# Patient Record
Sex: FEMALE | Race: BLACK OR AFRICAN AMERICAN | NOT HISPANIC OR LATINO | URBAN - METROPOLITAN AREA
[De-identification: names, ages, dates, MRNs, and addresses within clinical notes are randomized per-mention and may not be internally consistent; named-entity substitution may affect disease eponyms.]

---

## 2018-01-23 ENCOUNTER — INPATIENT (INPATIENT)
Facility: HOSPITAL | Age: 58
LOS: 2 days | Discharge: HOME | End: 2018-01-26
Attending: HOSPITALIST | Admitting: HOSPITALIST
Payer: COMMERCIAL

## 2018-01-23 VITALS
HEIGHT: 66 IN | OXYGEN SATURATION: 100 % | RESPIRATION RATE: 16 BRPM | DIASTOLIC BLOOD PRESSURE: 118 MMHG | WEIGHT: 175.05 LBS | SYSTOLIC BLOOD PRESSURE: 184 MMHG | HEART RATE: 107 BPM | TEMPERATURE: 98 F

## 2018-01-23 DIAGNOSIS — J05.10 ACUTE EPIGLOTTITIS WITHOUT OBSTRUCTION: ICD-10-CM

## 2018-01-23 LAB
ALBUMIN SERPL ELPH-MCNC: 4.2 G/DL — SIGNIFICANT CHANGE UP (ref 3.3–5)
ALP SERPL-CCNC: 82 U/L — SIGNIFICANT CHANGE UP (ref 40–120)
ALT FLD-CCNC: 16 U/L — SIGNIFICANT CHANGE UP (ref 12–78)
ANION GAP SERPL CALC-SCNC: 8 MMOL/L — SIGNIFICANT CHANGE UP (ref 5–17)
APTT BLD: 36.9 SEC — SIGNIFICANT CHANGE UP (ref 27.5–37.4)
AST SERPL-CCNC: 27 U/L — SIGNIFICANT CHANGE UP (ref 15–37)
BASOPHILS # BLD AUTO: 0.2 K/UL — SIGNIFICANT CHANGE UP (ref 0–0.2)
BASOPHILS NFR BLD AUTO: 2 % — SIGNIFICANT CHANGE UP (ref 0–2)
BILIRUB SERPL-MCNC: 0.7 MG/DL — SIGNIFICANT CHANGE UP (ref 0.2–1.2)
BUN SERPL-MCNC: 11 MG/DL — SIGNIFICANT CHANGE UP (ref 7–23)
CALCIUM SERPL-MCNC: 9.2 MG/DL — SIGNIFICANT CHANGE UP (ref 8.5–10.1)
CHLORIDE SERPL-SCNC: 103 MMOL/L — SIGNIFICANT CHANGE UP (ref 96–108)
CO2 SERPL-SCNC: 31 MMOL/L — SIGNIFICANT CHANGE UP (ref 22–31)
CREAT SERPL-MCNC: 0.84 MG/DL — SIGNIFICANT CHANGE UP (ref 0.5–1.3)
EOSINOPHIL # BLD AUTO: 0.2 K/UL — SIGNIFICANT CHANGE UP (ref 0–0.5)
EOSINOPHIL NFR BLD AUTO: 2 % — SIGNIFICANT CHANGE UP (ref 0–6)
FLUAV SPEC QL CULT: NEGATIVE — SIGNIFICANT CHANGE UP
FLUBV AG SPEC QL IA: NEGATIVE — SIGNIFICANT CHANGE UP
GLUCOSE SERPL-MCNC: 108 MG/DL — HIGH (ref 70–99)
HCT VFR BLD CALC: 40.6 % — SIGNIFICANT CHANGE UP (ref 34.5–45)
HGB BLD-MCNC: 13.9 G/DL — SIGNIFICANT CHANGE UP (ref 11.5–15.5)
INR BLD: 1.19 RATIO — HIGH (ref 0.88–1.16)
LACTATE SERPL-SCNC: 0.9 MMOL/L — SIGNIFICANT CHANGE UP (ref 0.7–2)
LYMPHOCYTES # BLD AUTO: 1.4 K/UL — SIGNIFICANT CHANGE UP (ref 1–3.3)
LYMPHOCYTES # BLD AUTO: 18 % — SIGNIFICANT CHANGE UP (ref 13–44)
MCHC RBC-ENTMCNC: 32.6 PG — SIGNIFICANT CHANGE UP (ref 27–34)
MCHC RBC-ENTMCNC: 34.1 GM/DL — SIGNIFICANT CHANGE UP (ref 32–36)
MCV RBC AUTO: 95.4 FL — SIGNIFICANT CHANGE UP (ref 80–100)
MONOCYTES # BLD AUTO: 0.5 K/UL — SIGNIFICANT CHANGE UP (ref 0–0.9)
MONOCYTES NFR BLD AUTO: 6.3 % — SIGNIFICANT CHANGE UP (ref 2–14)
NEUTROPHILS # BLD AUTO: 5.4 K/UL — SIGNIFICANT CHANGE UP (ref 1.8–7.4)
NEUTROPHILS NFR BLD AUTO: 71.7 % — SIGNIFICANT CHANGE UP (ref 43–77)
PLATELET # BLD AUTO: 307 K/UL — SIGNIFICANT CHANGE UP (ref 150–400)
POTASSIUM SERPL-MCNC: 4 MMOL/L — SIGNIFICANT CHANGE UP (ref 3.5–5.3)
POTASSIUM SERPL-SCNC: 4 MMOL/L — SIGNIFICANT CHANGE UP (ref 3.5–5.3)
PROT SERPL-MCNC: 8.5 GM/DL — HIGH (ref 6–8.3)
PROTHROM AB SERPL-ACNC: 13 SEC — HIGH (ref 9.8–12.7)
RBC # BLD: 4.25 M/UL — SIGNIFICANT CHANGE UP (ref 3.8–5.2)
RBC # FLD: 12 % — SIGNIFICANT CHANGE UP (ref 11–15)
SODIUM SERPL-SCNC: 142 MMOL/L — SIGNIFICANT CHANGE UP (ref 135–145)
WBC # BLD: 7.6 K/UL — SIGNIFICANT CHANGE UP (ref 3.8–10.5)
WBC # FLD AUTO: 7.6 K/UL — SIGNIFICANT CHANGE UP (ref 3.8–10.5)

## 2018-01-23 PROCEDURE — 71045 X-RAY EXAM CHEST 1 VIEW: CPT | Mod: 26

## 2018-01-23 PROCEDURE — 93010 ELECTROCARDIOGRAM REPORT: CPT

## 2018-01-23 PROCEDURE — 70360 X-RAY EXAM OF NECK: CPT | Mod: 26

## 2018-01-23 PROCEDURE — 70491 CT SOFT TISSUE NECK W/DYE: CPT | Mod: 26

## 2018-01-23 PROCEDURE — 99291 CRITICAL CARE FIRST HOUR: CPT

## 2018-01-23 RX ORDER — TIOTROPIUM BROMIDE 18 UG/1
1 CAPSULE ORAL; RESPIRATORY (INHALATION) DAILY
Qty: 0 | Refills: 0 | Status: DISCONTINUED | OUTPATIENT
Start: 2018-01-23 | End: 2018-01-25

## 2018-01-23 RX ORDER — CEFTRIAXONE 500 MG/1
1 INJECTION, POWDER, FOR SOLUTION INTRAMUSCULAR; INTRAVENOUS ONCE
Qty: 0 | Refills: 0 | Status: COMPLETED | OUTPATIENT
Start: 2018-01-23 | End: 2018-01-23

## 2018-01-23 RX ORDER — KETOROLAC TROMETHAMINE 30 MG/ML
30 SYRINGE (ML) INJECTION ONCE
Qty: 0 | Refills: 0 | Status: DISCONTINUED | OUTPATIENT
Start: 2018-01-23 | End: 2018-01-23

## 2018-01-23 RX ORDER — BENZOCAINE AND MENTHOL 5; 1 G/100ML; G/100ML
1 LIQUID ORAL ONCE
Qty: 0 | Refills: 0 | Status: DISCONTINUED | OUTPATIENT
Start: 2018-01-23 | End: 2018-01-23

## 2018-01-23 RX ORDER — DEXAMETHASONE 0.5 MG/5ML
4 ELIXIR ORAL EVERY 6 HOURS
Qty: 0 | Refills: 0 | Status: DISCONTINUED | OUTPATIENT
Start: 2018-01-23 | End: 2018-01-24

## 2018-01-23 RX ORDER — PIPERACILLIN AND TAZOBACTAM 4; .5 G/20ML; G/20ML
3.38 INJECTION, POWDER, LYOPHILIZED, FOR SOLUTION INTRAVENOUS EVERY 8 HOURS
Qty: 0 | Refills: 0 | Status: DISCONTINUED | OUTPATIENT
Start: 2018-01-23 | End: 2018-01-23

## 2018-01-23 RX ORDER — VANCOMYCIN HCL 1 G
1000 VIAL (EA) INTRAVENOUS ONCE
Qty: 0 | Refills: 0 | Status: COMPLETED | OUTPATIENT
Start: 2018-01-23 | End: 2018-01-23

## 2018-01-23 RX ORDER — CHLORHEXIDINE GLUCONATE 213 G/1000ML
1 SOLUTION TOPICAL DAILY
Qty: 0 | Refills: 0 | Status: DISCONTINUED | OUTPATIENT
Start: 2018-01-23 | End: 2018-01-24

## 2018-01-23 RX ORDER — ENOXAPARIN SODIUM 100 MG/ML
40 INJECTION SUBCUTANEOUS DAILY
Qty: 0 | Refills: 0 | Status: DISCONTINUED | OUTPATIENT
Start: 2018-01-23 | End: 2018-01-26

## 2018-01-23 RX ORDER — CEFTRIAXONE 500 MG/1
1 INJECTION, POWDER, FOR SOLUTION INTRAMUSCULAR; INTRAVENOUS EVERY 24 HOURS
Qty: 0 | Refills: 0 | Status: DISCONTINUED | OUTPATIENT
Start: 2018-01-24 | End: 2018-01-26

## 2018-01-23 RX ORDER — IPRATROPIUM/ALBUTEROL SULFATE 18-103MCG
3 AEROSOL WITH ADAPTER (GRAM) INHALATION EVERY 6 HOURS
Qty: 0 | Refills: 0 | Status: DISCONTINUED | OUTPATIENT
Start: 2018-01-23 | End: 2018-01-25

## 2018-01-23 RX ORDER — VANCOMYCIN HCL 1 G
1000 VIAL (EA) INTRAVENOUS EVERY 8 HOURS
Qty: 0 | Refills: 0 | Status: DISCONTINUED | OUTPATIENT
Start: 2018-01-23 | End: 2018-01-25

## 2018-01-23 RX ORDER — CEFTRIAXONE 500 MG/1
INJECTION, POWDER, FOR SOLUTION INTRAMUSCULAR; INTRAVENOUS
Qty: 0 | Refills: 0 | Status: DISCONTINUED | OUTPATIENT
Start: 2018-01-23 | End: 2018-01-23

## 2018-01-23 RX ORDER — LOSARTAN/HYDROCHLOROTHIAZIDE 100MG-25MG
0 TABLET ORAL
Qty: 0 | Refills: 0 | COMMUNITY

## 2018-01-23 RX ORDER — DEXAMETHASONE 0.5 MG/5ML
6 ELIXIR ORAL ONCE
Qty: 0 | Refills: 0 | Status: COMPLETED | OUTPATIENT
Start: 2018-01-23 | End: 2018-01-23

## 2018-01-23 RX ORDER — INFLUENZA VIRUS VACCINE 15; 15; 15; 15 UG/.5ML; UG/.5ML; UG/.5ML; UG/.5ML
0.5 SUSPENSION INTRAMUSCULAR ONCE
Qty: 0 | Refills: 0 | Status: COMPLETED | OUTPATIENT
Start: 2018-01-23 | End: 2018-01-24

## 2018-01-23 RX ORDER — ALBUTEROL 90 UG/1
1 AEROSOL, METERED ORAL EVERY 4 HOURS
Qty: 0 | Refills: 0 | Status: DISCONTINUED | OUTPATIENT
Start: 2018-01-23 | End: 2018-01-25

## 2018-01-23 RX ORDER — CEFTRIAXONE 500 MG/1
1 INJECTION, POWDER, FOR SOLUTION INTRAMUSCULAR; INTRAVENOUS ONCE
Qty: 0 | Refills: 0 | Status: DISCONTINUED | OUTPATIENT
Start: 2018-01-23 | End: 2018-01-23

## 2018-01-23 RX ORDER — PANTOPRAZOLE SODIUM 20 MG/1
40 TABLET, DELAYED RELEASE ORAL DAILY
Qty: 0 | Refills: 0 | Status: DISCONTINUED | OUTPATIENT
Start: 2018-01-23 | End: 2018-01-25

## 2018-01-23 RX ORDER — SODIUM CHLORIDE 9 MG/ML
1000 INJECTION, SOLUTION INTRAVENOUS
Qty: 0 | Refills: 0 | Status: DISCONTINUED | OUTPATIENT
Start: 2018-01-23 | End: 2018-01-25

## 2018-01-23 RX ORDER — CEFTRIAXONE 500 MG/1
INJECTION, POWDER, FOR SOLUTION INTRAMUSCULAR; INTRAVENOUS
Qty: 0 | Refills: 0 | Status: DISCONTINUED | OUTPATIENT
Start: 2018-01-23 | End: 2018-01-26

## 2018-01-23 RX ADMIN — SODIUM CHLORIDE 50 MILLILITER(S): 9 INJECTION, SOLUTION INTRAVENOUS at 14:10

## 2018-01-23 RX ADMIN — Medication 4 MILLIGRAM(S): at 10:48

## 2018-01-23 RX ADMIN — Medication 250 MILLIGRAM(S): at 08:23

## 2018-01-23 RX ADMIN — Medication 30 MILLIGRAM(S): at 08:07

## 2018-01-23 RX ADMIN — Medication 3 MILLILITER(S): at 17:15

## 2018-01-23 RX ADMIN — Medication 4 MILLIGRAM(S): at 23:46

## 2018-01-23 RX ADMIN — CEFTRIAXONE 100 GRAM(S): 500 INJECTION, POWDER, FOR SOLUTION INTRAMUSCULAR; INTRAVENOUS at 15:28

## 2018-01-23 RX ADMIN — PANTOPRAZOLE SODIUM 40 MILLIGRAM(S): 20 TABLET, DELAYED RELEASE ORAL at 16:28

## 2018-01-23 RX ADMIN — Medication 4 MILLIGRAM(S): at 17:43

## 2018-01-23 RX ADMIN — PIPERACILLIN AND TAZOBACTAM 25 GRAM(S): 4; .5 INJECTION, POWDER, LYOPHILIZED, FOR SOLUTION INTRAVENOUS at 14:04

## 2018-01-23 RX ADMIN — Medication 250 MILLIGRAM(S): at 16:28

## 2018-01-23 RX ADMIN — ENOXAPARIN SODIUM 40 MILLIGRAM(S): 100 INJECTION SUBCUTANEOUS at 14:04

## 2018-01-23 RX ADMIN — Medication 30 MILLIGRAM(S): at 03:14

## 2018-01-23 RX ADMIN — Medication 6 MILLIGRAM(S): at 03:14

## 2018-01-23 RX ADMIN — CHLORHEXIDINE GLUCONATE 1 APPLICATION(S): 213 SOLUTION TOPICAL at 16:44

## 2018-01-23 NOTE — H&P ADULT - NSHPLABSRESULTS_GEN_ALL_CORE
Lab Results:  CBC  CBC Full  -  ( 23 Jan 2018 03:46 )  WBC Count : 7.6 K/uL  Hemoglobin : 13.9 g/dL  Hematocrit : 40.6 %  Platelet Count - Automated : 307 K/uL  Mean Cell Volume : 95.4 fl  Mean Cell Hemoglobin : 32.6 pg  Mean Cell Hemoglobin Concentration : 34.1 gm/dL  Auto Neutrophil # : 5.4 K/uL  Auto Lymphocyte # : 1.4 K/uL  Auto Monocyte # : 0.5 K/uL  Auto Eosinophil # : 0.2 K/uL  Auto Basophil # : 0.2 K/uL  Auto Neutrophil % : 71.7 %  Auto Lymphocyte % : 18.0 %  Auto Monocyte % : 6.3 %  Auto Eosinophil % : 2.0 %  Auto Basophil % : 2.0 %    .		Differential:	[] Automated		[] Manual  Chemistry                        13.9   7.6   )-----------( 307      ( 23 Jan 2018 03:46 )             40.6     01-23    142  |  103  |  11  ----------------------------<  108<H>  4.0   |  31  |  0.84    Ca    9.2      23 Jan 2018 03:46    TPro  8.5<H>  /  Alb  4.2  /  TBili  0.7  /  DBili  x   /  AST  27  /  ALT  16  /  AlkPhos  82  01-23    LIVER FUNCTIONS - ( 23 Jan 2018 03:46 )  Alb: 4.2 g/dL / Pro: 8.5 gm/dL / ALK PHOS: 82 U/L / ALT: 16 U/L / AST: 27 U/L / GGT: x           PT/INR - ( 23 Jan 2018 03:46 )   PT: 13.0 sec;   INR: 1.19 ratio         PTT - ( 23 Jan 2018 03:46 )  PTT:36.9 sec          MICROBIOLOGY/CULTURES:      RADIOLOGY RESULTS:

## 2018-01-23 NOTE — ED ADULT TRIAGE NOTE - CHIEF COMPLAINT QUOTE
BIBA,  throat pain and then tightening.   pt took codeine for throat pain at 9:30 and felt tightening.  pt was Rx codeine for bronchitis 3 weeks ago and had no adverse reaction prior. BIBA,  hypertension and  throat pain and then tightening.   pt took codeine for throat pain at 9:30 and felt tightening.  pt was Rx codeine for bronchitis 3 weeks ago and had no adverse reaction prior.  pt in no acute distress

## 2018-01-23 NOTE — CHART NOTE - NSCHARTNOTEFT_GEN_A_CORE
58 yo female with PMH: HTN admitted with c/o sore throat that started yesterday.  Pt stated that the pain started abruptly during the day and got progressively worse despite taking codeine.  Near the end of the night pt stated that it hurt so much that she couldn't swallow anything.  She did c/o of mild cough but non-productive, more of just salivary secretions.  Pt called 911 when she felt her throat closing up and felt like she was gasping for air.  On arrival to ER pt noted to have "hot potato" voice and was given a dose of decadron and toradol with pt having immediate relief of her symptoms stating that she wasn't having any more significant pain and does not feeling of throat closing.  Neck XR in ER suspicious for epiglottitis.Plan:Pt started on decadron in, will continue for total of 3 doses,  iv abx with ceftriaxone and vancomycin, ENT consult Raj Isidro called.  Will keep NPO for now. Gentle IVF as pt is NPO.  Will treat HTN prn with IV meds.  noebs prn 58 yo female with PMH: HTN admitted with c/o sore throat that started yesterday.  Pt stated that the pain started abruptly during the day and got progressively worse despite taking codeine.  Near the end of the night pt stated that it hurt so much that she couldn't swallow anything.  She did c/o of mild cough but non-productive, more of just salivary secretions.  Pt called 911 when she felt her throat closing up and felt like she was gasping for air.  On arrival to ER pt noted to have "hot potato" voice and was given a dose of decadron and toradol with pt having immediate relief of her symptoms stating that she wasn't having any more significant pain and does not feeling of throat closing.  Neck XR in ER suspicious for epiglottitis. Pt started on decadron in, will continue for total of 3 doses,  iv abx with ceftriaxone and vancomycin, ENT consult Raj Isidro called, gentle IVF. Pt stable enough for transfer to telemetry. Case discussed w/ Dr. Devries. Pt signed out to Hospitalist service.

## 2018-01-23 NOTE — H&P ADULT - NSHPPHYSICALEXAM_GEN_ALL_CORE
PHYSICAL EXAM:    GENERAL: NAD, well-groomed, well-developed  HEAD:  Atraumatic, Normocephalic  ENMT: difficult to assess pharynx or uvula, no obvious tongue edema or redness  NECK: Supple,   NERVOUS SYSTEM:  Alert & Oriented X3, Good concentration;   CHEST/LUNG: Clear to percussion bilaterally;  HEART: Regular rate and rhythm; tachycardia  ABDOMEN: Soft, Nontender, Nondistended;   EXTREMITIES: No clubbing, cyanosis, or edema  LYMPH: No lymphadenopathy noted

## 2018-01-23 NOTE — ED ADULT NURSE NOTE - OBJECTIVE STATEMENT
58 y/o female PMHx HTN presents to the ED with HTN and "throat tightening" today. Denies dizziness, chest pain, blurry vision, double vision, cough, fevers, chills. Patient took codeine for throat pain at 930 pm, no relief

## 2018-01-23 NOTE — H&P ADULT - NSHPREVIEWOFSYSTEMS_GEN_ALL_CORE
REVIEW OF SYSTEMS      REVIEW OF SYSTEMS:    CONSTITUTIONAL: No fever,  ENMT:   ++throat pain  RESPIRATORY: mild cough, no wheezing, chills or hemoptysis; ++shortness of breath  CARDIOVASCULAR: No chest pain, palpitations,  GASTROINTESTINAL: No abdominal or epigastric pain. No nausea, vomiting,   GENITOURINARY: No dysuria,   NEUROLOGICAL: No headaches,   SKIN: No itching, burning, rashes, or lesions   MUSCULOSKELETAL: No joint pain or swelling; No muscle, back, or extremity pain

## 2018-01-23 NOTE — ED ADULT NURSE REASSESSMENT NOTE - NS ED NURSE REASSESS COMMENT FT1
received pt to bed 21 alert and oriented times 4. denies pain. states she only has pain when she tries to swallow. no drooling. no respiratory distress at present. noted with 20 guage iv to right ac. pt for admission for epiglotitis. plan of care explained to pt who verbalized understanding.

## 2018-01-23 NOTE — ED ADULT NURSE NOTE - CHIEF COMPLAINT QUOTE
BIBA,  hypertension and  throat pain and then tightening.   pt took codeine for throat pain at 9:30 and felt tightening.  pt was Rx codeine for bronchitis 3 weeks ago and had no adverse reaction prior.  pt in no acute distress

## 2018-01-23 NOTE — H&P ADULT - PROBLEM SELECTOR PLAN 1
iv abx with ceftriaxone and vancomycin.  Pt started on decadron in ER, will continue for total of 3 doses.  ENT consult Raj Isidro called.  Will keep NPO for now. Gentle IVF as pt is NPO.  Will treat HTN prn with IV meds.  duonebs prn

## 2018-01-23 NOTE — ED PROVIDER NOTE - PROGRESS NOTE DETAILS
Pt is alert and oriented x 3 speaking in clear full sentences no drooling no nasal flaring no shoulders retractions no spitting breath sounds are clear good equal bilaterally, Pt appears very comfortable sitting up in the stretcher in a bright light room ICU consult is eval pt now Pt's care is signed out to me now. ICU DR. Villagran accepts pt.

## 2018-01-23 NOTE — ED PROVIDER NOTE - PHYSICAL EXAMINATION
Gen: Alert, speaking in complete sentences  Head: NC, AT, EOMI, normal lids/conjunctiva  ENT: normal hearing, patent oropharynx without erythema/exudate, uvula midline  Neck: supple, no tenderness/meningismus/JVD, Trachea midline, FROM  Pulm: Bilateral clear BS, normal resp effort, no wheeze/stridor/retractions  CV: RRR, no M/R/G, +dist pulses  Abd: soft, NT/ND, +BS, no guarding/rebound tenderness  Mskel: no edema/erythema/cyanosis  Skin: no rash  Neuro: AAOx3, no sensory/motor deficits

## 2018-01-23 NOTE — H&P ADULT - HISTORY OF PRESENT ILLNESS
56 yo female with PMH: HTN admitted with c/o 58 yo female with PMH: HTN admitted with c/o sore throat that started yesterday.  Pt stated that the pain started abruptl 58 yo female with PMH: HTN admitted with c/o sore throat that started yesterday.  Pt stated that the pain started abruptly during the day and got progressively worse despite taking codeine.  Near the end of the night pt stated that it hurt so much that she couldn't swallow anything.  She did c/o of mild cough but non-productive, more of just salivary secretions.  Pt called 911 when she felt her throat closing up and felt like she was gasping for air.  On arrival to ER pt noted to have "hot potato" voice and was given a dose of decadron and toradol with pt having immediate relief of her symptoms stating that she wasn't having any more significant pain and does not feeling of throat closing.  Neck XR in ER suspicious for epiglottitis

## 2018-01-23 NOTE — ED PROVIDER NOTE - CRITICAL CARE PROVIDED
additional history taking/direct patient care (not related to procedure)/documentation/consult w/ pt's family directly relating to pts condition/interpretation of diagnostic studies/consultation with other physicians

## 2018-01-23 NOTE — H&P ADULT - ASSESSMENT
58 yo female with PMH: HTN admitted with c/o sore throat that started yesterday concerning for epiglottitis

## 2018-01-23 NOTE — H&P ADULT - ATTENDING COMMENTS
58 yo female with PMH: HTN admitted w/ sore throat, SOB, dysphagia and concern for epiglottitis. S/p steroids and antihistamines. CT does not show epiglottitis. Cont steroids, anhti histamine for now. f/u ENT re: fiberoptic eval. Cont abx for now. Monitor airway for compromise from potential epiglottitis.

## 2018-01-24 DIAGNOSIS — E04.1 NONTOXIC SINGLE THYROID NODULE: ICD-10-CM

## 2018-01-24 DIAGNOSIS — J02.9 ACUTE PHARYNGITIS, UNSPECIFIED: ICD-10-CM

## 2018-01-24 LAB
ANION GAP SERPL CALC-SCNC: 7 MMOL/L — SIGNIFICANT CHANGE UP (ref 5–17)
BUN SERPL-MCNC: 15 MG/DL — SIGNIFICANT CHANGE UP (ref 7–23)
CALCIUM SERPL-MCNC: 9 MG/DL — SIGNIFICANT CHANGE UP (ref 8.5–10.1)
CHLORIDE SERPL-SCNC: 105 MMOL/L — SIGNIFICANT CHANGE UP (ref 96–108)
CO2 SERPL-SCNC: 31 MMOL/L — SIGNIFICANT CHANGE UP (ref 22–31)
CREAT SERPL-MCNC: 0.94 MG/DL — SIGNIFICANT CHANGE UP (ref 0.5–1.3)
GLUCOSE SERPL-MCNC: 156 MG/DL — HIGH (ref 70–99)
HCT VFR BLD CALC: 37.9 % — SIGNIFICANT CHANGE UP (ref 34.5–45)
HGB BLD-MCNC: 13 G/DL — SIGNIFICANT CHANGE UP (ref 11.5–15.5)
MAGNESIUM SERPL-MCNC: 2.1 MG/DL — SIGNIFICANT CHANGE UP (ref 1.6–2.6)
MCHC RBC-ENTMCNC: 32 PG — SIGNIFICANT CHANGE UP (ref 27–34)
MCHC RBC-ENTMCNC: 34.3 GM/DL — SIGNIFICANT CHANGE UP (ref 32–36)
MCV RBC AUTO: 93.3 FL — SIGNIFICANT CHANGE UP (ref 80–100)
NRBC # BLD: 0 /100 WBCS — SIGNIFICANT CHANGE UP (ref 0–0)
PHOSPHATE SERPL-MCNC: 3.4 MG/DL — SIGNIFICANT CHANGE UP (ref 2.5–4.5)
PLATELET # BLD AUTO: 306 K/UL — SIGNIFICANT CHANGE UP (ref 150–400)
POTASSIUM SERPL-MCNC: 4 MMOL/L — SIGNIFICANT CHANGE UP (ref 3.5–5.3)
POTASSIUM SERPL-SCNC: 4 MMOL/L — SIGNIFICANT CHANGE UP (ref 3.5–5.3)
RBC # BLD: 4.06 M/UL — SIGNIFICANT CHANGE UP (ref 3.8–5.2)
RBC # FLD: 12.4 % — SIGNIFICANT CHANGE UP (ref 10.3–14.5)
SODIUM SERPL-SCNC: 143 MMOL/L — SIGNIFICANT CHANGE UP (ref 135–145)
WBC # BLD: 9.42 K/UL — SIGNIFICANT CHANGE UP (ref 3.8–10.5)
WBC # FLD AUTO: 9.42 K/UL — SIGNIFICANT CHANGE UP (ref 3.8–10.5)

## 2018-01-24 PROCEDURE — 99253 IP/OBS CNSLTJ NEW/EST LOW 45: CPT

## 2018-01-24 PROCEDURE — 99233 SBSQ HOSP IP/OBS HIGH 50: CPT

## 2018-01-24 RX ORDER — BENZOCAINE AND MENTHOL 5; 1 G/100ML; G/100ML
1 LIQUID ORAL EVERY 4 HOURS
Qty: 0 | Refills: 0 | Status: DISCONTINUED | OUTPATIENT
Start: 2018-01-24 | End: 2018-01-26

## 2018-01-24 RX ADMIN — Medication 250 MILLIGRAM(S): at 09:25

## 2018-01-24 RX ADMIN — Medication 4 MILLIGRAM(S): at 06:13

## 2018-01-24 RX ADMIN — Medication 3 MILLILITER(S): at 01:02

## 2018-01-24 RX ADMIN — ENOXAPARIN SODIUM 40 MILLIGRAM(S): 100 INJECTION SUBCUTANEOUS at 13:25

## 2018-01-24 RX ADMIN — INFLUENZA VIRUS VACCINE 0.5 MILLILITER(S): 15; 15; 15; 15 SUSPENSION INTRAMUSCULAR at 18:35

## 2018-01-24 RX ADMIN — Medication 3 MILLILITER(S): at 17:01

## 2018-01-24 RX ADMIN — Medication 3 MILLILITER(S): at 11:12

## 2018-01-24 RX ADMIN — Medication 250 MILLIGRAM(S): at 00:12

## 2018-01-24 RX ADMIN — CEFTRIAXONE 100 GRAM(S): 500 INJECTION, POWDER, FOR SOLUTION INTRAMUSCULAR; INTRAVENOUS at 14:59

## 2018-01-24 RX ADMIN — SODIUM CHLORIDE 50 MILLILITER(S): 9 INJECTION, SOLUTION INTRAVENOUS at 13:25

## 2018-01-24 RX ADMIN — Medication 3 MILLILITER(S): at 06:05

## 2018-01-24 RX ADMIN — Medication 250 MILLIGRAM(S): at 18:32

## 2018-01-24 RX ADMIN — PANTOPRAZOLE SODIUM 40 MILLIGRAM(S): 20 TABLET, DELAYED RELEASE ORAL at 13:24

## 2018-01-24 NOTE — PROGRESS NOTE ADULT - PROBLEM SELECTOR PLAN 2
s/p decadron in ICU for total of 3 doses.    ENT consult Raj Isidro noted, s/p laryngoscopy and no e/o epiglottitis , air way patent

## 2018-01-24 NOTE — CHART NOTE - NSCHARTNOTEFT_GEN_A_CORE
[facility]  Head & Neck Surgery Procedure Note      Name:                   Kathya Navarrete	             Surgeon:	Deejay Anthony MD  					  Date of Procedure: 01/24/2018                          Assistant:  None    Record Number:	 [id number]                             Anesthesia:  Local Anesthesia       Preoperative diagnosis:	Dysphagia, unspecified (R13.10)  	  Postoperative diagnosis:	Dysphagia, unspecified (R13.10)    Procedure:		Flexible Fiberoptic Laryngoscopy  ([id number])    INDICATION:  The patient is a 56 yo female with a past medical history significant for HTN presents to the Emergency Room at [facility] with a chief complaint of painful swallowing and drooling for the past several hours. The patient stated that the pain started abruptly during the day and got progressively worse despite taking codeine.  Near the end of the night pt stated that it hurt so much that she couldn't swallow anything.  She did c/o of mild cough but non-productive, more of just salivary secretions.  Pt called 911 when she felt her throat closing up and felt like she was gasping for air.  On arrival to ER pt noted to have "hot potato" voice and was given a dose of decadron and toradol with pt having immediate relief of her symptoms stating that she wasn't having any more significant pain and does not feeling of throat closing.  Neck XR in ER suspicious for epiglottitis    PROCEDURE: The patient was seen and her bilateral nasal cavities were prepped and sprayed with topical anesthesia of neosynephrine.   Following this, a fiberoptic flexible laryngoscope was passed into the left nasal cavity, and then slowly and meticulously moved posteriorly to the nasopharynx.  There was no evidence of pus or blood within the left anterior and posterior superior lateral nasal wall. There was clear mucous within the nasal cavity.  Once at nasopharynx the skull base and lateral walls of the eustachian tubes were examined for lesions and masses.  There was no blood or masses within the nasopharynx. There was mild prominence of the nasopharyngeal soft tissue consistent with inflammatory reaction.  The fiberoptic endoscope was then carefully directed inferiorly and moved to the hypopharynx and glottis.  There was no fullness of the base of tongue.  There was 1-2+ prominence of the tonsils bilaterally (equally) and without exudate. There was no evidence of retropharyngeal erythema or edema.  There was mild  diffuse erythema  of the pharyngeal wall and supraglottic structure consistent with acue pharyngitis. The epiglottis was within normal limits.  The true vocal cords appeared to be mobile bilaterally.  There was no evidence of pooling of secretions, or obvious aspiration or penetration of liquid into the glottis.  The airway was widely patent. The patient tolerated the procedure well.

## 2018-01-24 NOTE — CONSULT NOTE ADULT - COMMENTS
Vital Signs Last 24 Hrs  T(C): 36.5 (23 Jan 2018 23:13), Max: 37.1 (23 Jan 2018 10:56)  T(F): 97.7 (23 Jan 2018 23:13), Max: 98.8 (23 Jan 2018 15:11)  HR: 71 (24 Jan 2018 00:00) (67 - 105)  BP: 150/85 (24 Jan 2018 00:00) (118/65 - 156/80)  BP(mean): 101 (24 Jan 2018 00:00) (77 - 103)  RR: 18 (24 Jan 2018 00:00) (13 - 22)  SpO2: 97% (24 Jan 2018 00:00) (92% - 99%)

## 2018-01-24 NOTE — CONSULT NOTE ADULT - ENMT COMMENTS
Flexible Fiberoptic Laryngoscopy: clear nasopharynx to glottis, diffuse pharygneal erythema, epiglottis wnl, tvc mobile b/l, airway widely patent

## 2018-01-24 NOTE — CONSULT NOTE ADULT - SUBJECTIVE AND OBJECTIVE BOX
History of Present Illness: 	  The patient is a 58 yo female with a past medical history significant for HTN presents to the Emergency Room at [facility] with a chief complaint of painful swallowing and drooling for the past several hours. The patient stated that the pain started abruptly during the day and got progressively worse despite taking codeine.  Near the end of the night pt stated that it hurt so much that she couldn't swallow anything.  She did c/o of mild cough but non-productive, more of just salivary secretions.  Pt called 911 when she felt her throat closing up and felt like she was gasping for air.  On arrival to ER pt noted to have "hot potato" voice and was given a dose of decadron and toradol with pt having immediate relief of her symptoms stating that she wasn't having any more significant pain and does not feeling of throat closing.  Neck XR in ER suspicious for epiglottitis        Allergies and Intolerances:        Allergies:  	No Known Allergies:     Home Medications:   * Patient Currently Takes Medications as of 23-Jan-2018 02:25 documented in Structured Notes  · 	Hyzaar: Last Dose Taken:      .    Patient History:    Past Medical History:  HTN (hypertension).     Tobacco Screening:  · Core Measure Site	Yes	  · Has the patient used tobacco in the past 30 days?	No 	    Risk Assessment:    Present on Admission:  Deep Venous Thrombosis	no 	  Pulmonary Embolus	no 	     Heart Failure:  Does this patient have a history of or has been diagnosed with heart failure? no.    HIV Screen (per [facility], HIV screening must be offered to every individual between ages 13 and 64)	Offered and patient declined 	  Hepatitis C Screen (per [facility], hepatitis C screening must be offered to every individual born between 1945 and 1965)	Offered and patient declined 	      labs:  CBC Full  -  ( 23 Jan 2018 03:46 )  WBC Count : 7.6 K/uL  Hemoglobin : 13.9 g/dL  Hematocrit : 40.6 %  Platelet Count - Automated : 307 K/uL  Mean Cell Volume : 95.4 fl  Mean Cell Hemoglobin : 32.6 pg  Mean Cell Hemoglobin Concentration : 34.1 gm/dL  Auto Neutrophil # : 5.4 K/uL  Auto Lymphocyte # : 1.4 K/uL  Auto Monocyte # : 0.5 K/uL  Auto Eosinophil # : 0.2 K/uL  Auto Basophil # : 0.2 K/uL  Auto Neutrophil % : 71.7 %  Auto Lymphocyte % : 18.0 %  Auto Monocyte % : 6.3 %  Auto Eosinophil % : 2.0 %  Auto Basophil % : 2.0 %

## 2018-01-25 LAB — VANCOMYCIN TROUGH SERPL-MCNC: 23.8 UG/ML — HIGH (ref 10–20)

## 2018-01-25 PROCEDURE — 99233 SBSQ HOSP IP/OBS HIGH 50: CPT

## 2018-01-25 RX ADMIN — BENZOCAINE AND MENTHOL 1 LOZENGE: 5; 1 LIQUID ORAL at 14:50

## 2018-01-25 RX ADMIN — Medication 3 MILLILITER(S): at 00:08

## 2018-01-25 RX ADMIN — Medication 3 MILLILITER(S): at 05:20

## 2018-01-25 RX ADMIN — BENZOCAINE AND MENTHOL 1 LOZENGE: 5; 1 LIQUID ORAL at 09:04

## 2018-01-25 RX ADMIN — Medication 250 MILLIGRAM(S): at 00:52

## 2018-01-25 RX ADMIN — CEFTRIAXONE 100 GRAM(S): 500 INJECTION, POWDER, FOR SOLUTION INTRAMUSCULAR; INTRAVENOUS at 17:04

## 2018-01-25 RX ADMIN — BENZOCAINE AND MENTHOL 1 LOZENGE: 5; 1 LIQUID ORAL at 18:36

## 2018-01-25 RX ADMIN — Medication 250 MILLIGRAM(S): at 09:03

## 2018-01-25 RX ADMIN — Medication 3 MILLILITER(S): at 11:00

## 2018-01-25 RX ADMIN — ENOXAPARIN SODIUM 40 MILLIGRAM(S): 100 INJECTION SUBCUTANEOUS at 12:00

## 2018-01-25 NOTE — CONSULT NOTE ADULT - ASSESSMENT
Thyroid Nodules
57 yr old female seen with
Assessment:  The patient is a 56 yo female with a past medical history significant for HTN presents to the Emergency Room at [facility] with a chief complaint of painful swallowing and drooling admitted for possible epiglottitis.  Laryngoscopy consistent with Acute Pharyngitis. No e/o epiglottitis. Airway widely patent    Plan:  1) antibiotics as per primary team  2) cepacol lozenges prn odynophagia  3) diet as tolerated  4) dispo as per primary team

## 2018-01-25 NOTE — CONSULT NOTE ADULT - PROBLEM SELECTOR RECOMMENDATION 9
Thyroid nodules on CT scan   will need Ultrasound for definitive diagnosis and characterisation of the thyroid and its nodules - can be done as an out-patient   Check TSH  and free T4 and thyroid antibodies   Thank You for the courtesy of this consultation !!!
s/p steroids  swelling resolved  no obvious exudate   check strept throat   cont rocephin   switch to oral once able to take oral   diet has been started at liquids now  s/p laryngoscopy by ENT

## 2018-01-25 NOTE — CONSULT NOTE ADULT - SUBJECTIVE AND OBJECTIVE BOX
Patient is a 57y old  Female who presents with a chief complaint of sore throat (23 Jan 2018 16:02)      Reason For Consult: thyroid nodules    HPI:  56 yo female with PMH: HTN admitted with c/o sore throat that started yesterday.  Pt stated that the pain started abruptly during the day and got progressively worse despite taking codeine.  Near the end of the night pt stated that it hurt so much that she couldn't swallow anything.  She did c/o of mild cough but non-productive, more of just salivary secretions.  Pt called 911 when she felt her throat closing up and felt like she was gasping for air.  On arrival to ER pt noted to have "hot potato" voice and was given a dose of decadron and toradol with pt having immediate relief of her symptoms stating that she wasn't having any more significant pain and does not feeling of throat closing.  Neck XR in ER suspicious for epiglottitis (23 Jan 2018 16:02)  Endocrine History : No prior thyroid or other Endocrine history     PAST MEDICAL & SURGICAL HISTORY:  HTN (hypertension)      FAMILY HISTORY:        Social History:    MEDICATIONS  (STANDING):  cefTRIAXone   IVPB 1 Gram(s) IV Intermittent every 24 hours  cefTRIAXone   IVPB      enoxaparin Injectable 40 milliGRAM(s) SubCutaneous daily    MEDICATIONS  (PRN):  benzocaine 15 mG/menthol 3.6 mG Lozenge 1 Lozenge Oral every 4 hours PRN Sore Throat      REVIEW OF SYSTEMS:  CONSTITUTIONAL:  as per HPI  HEENT:  Eyes:  No diplopia or blurred vision. ENT:  No earache, sore throat or runny nose.  CARDIOVASCULAR:  No pressure, squeezing, strangling, tightness, heaviness or aching about the chest, neck, axilla or epigastrium.  RESPIRATORY:  No cough, shortness of breath, PND or orthopnea.  GASTROINTESTINAL:  No nausea, vomiting or diarrhea.  GENITOURINARY:  No dysuria, frequency or urgency. No Blood in urine  MUSCULOSKELETAL:  no joint aches, no muscle pain, myalgia  SKIN:  No change in skin, hair or nails.  NEUROLOGIC:  No paresthesias, fasciculations, seizures or weakness.  PSYCHIATRIC:  No disorder of thought or mood.  ENDOCRINE:  No heat or cold intolerance, polyuria or polydipsia. abnormal weight gain or loss, oral thrush  HEMATOLOGICAL:  No easy bruising or bleeding.     T(C): 36.6 (01-25-18 @ 17:14), Max: 37.1 (01-25-18 @ 05:34)  HR: 94 (01-25-18 @ 17:14) (58 - 94)  BP: 129/80 (01-25-18 @ 17:14) (118/65 - 129/80)  RR: 18 (01-25-18 @ 17:14) (16 - 18)  SpO2: 96% (01-25-18 @ 17:14) (96% - 100%)  Wt(kg): --    PHYSICAL EXAM:  GENERAL: NAD, well-groomed, well-developed  HEAD:  Atraumatic, Normocephalic  EYES: EOMI, PERRLA, conjunctiva and sclera clear  ENMT: No tonsillar erythema, exudates, or enlargement; Moist mucous membranes, Good dentition, No lesions  NECK: Supple, No JVD, Normal thyroid, No nodules palpable   NERVOUS SYSTEM:  Alert & Oriented X3, Good concentration; Motor Strength 5/5 B/L upper and lower extremities; DTRs 2+ intact and symmetric  CHEST/LUNG: Clear to percussion bilaterally; No rales, rhonchi, wheezing, or rubs  HEART: Regular rate and rhythm; No murmurs, rubs, or gallops  ABDOMEN: Soft, Nontender, Nondistended; Bowel sounds present  EXTREMITIES:  2+ Peripheral Pulses, No clubbing, cyanosis, or edema  LYMPH: No lymphadenopathy noted  SKIN: No rashes or lesions    CAPILLARY BLOOD GLUCOSE                                13.0   9.42  )-----------( 306      ( 24 Jan 2018 10:14 )             37.9       CMP:  01-24 @ 10:14  SGPT --  Albumin --   Alk Phos --   Anion Gap 7   SGOT --   Total Bili --   BUN 15   Calcium Total 9.0   CO2 31   Chloride 105   Creatinine 0.94   eGFR if AA 79   eGFR if non AA 68   Glucose 156   Potassium 4.0   Protein --   Sodium 143      Thyroid Function Tests:      Diabetes Tests:     Parathyroids:     Adrenals:       Radiology:

## 2018-01-26 VITALS
SYSTOLIC BLOOD PRESSURE: 143 MMHG | HEART RATE: 84 BPM | OXYGEN SATURATION: 95 % | DIASTOLIC BLOOD PRESSURE: 68 MMHG | RESPIRATION RATE: 16 BRPM | TEMPERATURE: 98 F

## 2018-01-26 LAB
T4 FREE SERPL-MCNC: 1.4 NG/DL — SIGNIFICANT CHANGE UP (ref 0.9–1.8)
THYROGLOB AB FLD IA-ACNC: <20 IU/ML — SIGNIFICANT CHANGE UP (ref 0–40)
THYROGLOB AB SERPL-ACNC: <20 IU/ML — SIGNIFICANT CHANGE UP (ref 0–40)
THYROPEROXIDASE AB SERPL-ACNC: <10 IU/ML — SIGNIFICANT CHANGE UP (ref 0–34.9)
TSH SERPL-MCNC: 2.48 UU/ML — SIGNIFICANT CHANGE UP (ref 0.36–3.74)

## 2018-01-26 PROCEDURE — 99239 HOSP IP/OBS DSCHRG MGMT >30: CPT

## 2018-01-26 RX ADMIN — BENZOCAINE AND MENTHOL 1 LOZENGE: 5; 1 LIQUID ORAL at 12:27

## 2018-01-26 NOTE — DISCHARGE NOTE ADULT - HOSPITAL COURSE
56 yo female with PMH: HTN admitted with c/o sore throat  .  Pt stated that the pain started abruptly during the day and got progressively worse despite taking codeine.  Near the end of the night pt stated that it hurt so much that she couldn't swallow anything.  She did c/o of mild cough but non-productive, more of just salivary secretions.  Pt called 911 when she felt her throat closing up and felt like she was gasping for air.  On arrival to ER pt noted to have "hot potato" voice and was given a dose of decadron and toradol with pt having immediate relief of her symptoms stating that she wasn't having any more significant pain and does not feeling of throat closing.  Neck XR in ER suspicious for epiglottitis. Pt started on decadron IV,   iv abx with ceftriaxone and vancomycin, was admitted to ICU, ID and  ENT consult Raj Isidro consulted who performed  Flexible Fiberoptic Laryngoscopy , findings consistent w/   with acute pharyngitis. The epiglottis was within normal limits.d/c d ABX, ID cleared for d/c. pt tolerating PO well with no discomfort. d/c planning w/ outpt f/u 58 yo female with PMH: HTN admitted with c/o sore throat  .  Pt stated that the pain started abruptly during the day and got progressively worse despite taking codeine.  Near the end of the night pt stated that it hurt so much that she couldn't swallow anything.  She did c/o of mild cough but non-productive, more of just salivary secretions.  Pt called 911 when she felt her throat closing up and felt like she was gasping for air.  On arrival to ER pt noted to have "hot potato" voice and was given a dose of decadron and toradol with pt having immediate relief of her symptoms stating that she wasn't having any more significant pain and does not feeling of throat closing.  Neck XR in ER suspicious for epiglottitis. Pt started on decadron IV,   iv abx with ceftriaxone and vancomycin, was admitted to ICU, ID and  ENT consult Raj Isidro consulted who performed  Flexible Fiberoptic Laryngoscopy , findings consistent w/   with acute pharyngitis. The epiglottis was within normal limits.d/c d ABX, ID cleared for d/c. pt tolerating PO well with no discomfort. d/c planning w/ outpt f/u  Time spent =60min

## 2018-01-26 NOTE — PROGRESS NOTE ADULT - SUBJECTIVE AND OBJECTIVE BOX
Patient is a 57y old  Female who presents with a chief complaint of sore throat (23 Jan 2018 16:02)      Interval History: TSH is 2.48, free T4 and thyroid antibodies are Pending   definitive ultrasound needs to be done     MEDICATIONS  (STANDING):  enoxaparin Injectable 40 milliGRAM(s) SubCutaneous daily    MEDICATIONS  (PRN):  benzocaine 15 mG/menthol 3.6 mG Lozenge 1 Lozenge Oral every 4 hours PRN Sore Throat      Allergies    No Known Allergies    Intolerances        REVIEW OF SYSTEMS:  CONSTITUTIONAL:   EYES: No eye pain, visual disturbances, or discharge  ENMT:  No difficulty hearing, tinnitus, vertigo; No sinus or throat pain  NECK: No pain or stiffness  RESPIRATORY: No cough, wheezing, chills or hemoptysis; No shortness of breath  CARDIOVASCULAR: No chest pain, palpitations, dizziness, or leg swelling  GASTROINTESTINAL: No abdominal or epigastric pain. No nausea, vomiting, or hematemesis; No diarrhea or constipation. No melena or hematochezia.  GENITOURINARY: No dysuria, frequency, hematuria, or incontinence  NEUROLOGICAL: No headaches, memory loss, loss of strength, numbness, or tremors  SKIN: No itching, burning, rashes, or lesions   LYMPH NODES: No enlarged glands  ENDOCRINE: No heat or cold intolerance; No hair loss  MUSCULOSKELETAL: No joint pain or swelling; No muscle, back, or extremity pain  PSYCHIATRIC: No depression, anxiety, mood swings, or difficulty sleeping  HEME/LYMPH: No easy bruising, or bleeding gums  ALLERY AND IMMUNOLOGIC: No hives or eczema    Vital Signs Last 24 Hrs  T(C): 36.6 (26 Jan 2018 05:55), Max: 37.1 (25 Jan 2018 12:14)  T(F): 97.8 (26 Jan 2018 05:55), Max: 98.8 (25 Jan 2018 12:14)  HR: 91 (26 Jan 2018 05:55) (79 - 94)  BP: 144/89 (26 Jan 2018 05:55) (124/76 - 144/89)  BP(mean): --  RR: 16 (26 Jan 2018 05:55) (16 - 18)  SpO2: 96% (26 Jan 2018 05:55) (95% - 98%)    PHYSICAL EXAM:  GENERAL:   HEAD:  Atraumatic, Normocephalic  EYES: EOMI, PERRLA, conjunctiva and sclera clear  ENMT: No tonsillar erythema, exudates, or enlargement; Moist mucous membranes, Good dentition, No lesions  NECK: Supple, No JVD, Normal thyroid  NERVOUS SYSTEM:  Alert & Oriented X3, Good concentration; Motor Strength 5/5 B/L upper and lower extremities; DTRs 2+ intact and symmetric  CHEST/LUNG: Clear to percussion bilaterally; No rales, rhonchi, wheezing, or rubs  HEART: Regular rate and rhythm; No murmurs, rubs, or gallops  ABDOMEN: Soft, Nontender, Nondistended; Bowel sounds present  EXTREMITIES:  2+ Peripheral Pulses, No clubbing, cyanosis, or edema  SKIN: No rashes or lesions    LABS:        CAPILLARY BLOOD GLUCOSE        Lipid panel:           Thyroid:01-26 @ 06:28 TSH 2.480 <<Free T4>> -- <<T3>> -- <<TG Ab>> -- <<ATPOAB>> -- <<TBG>> -- <<TSI>> -- Reverse T3 --    Diabetes Tests:  Parathyroid Panel:  Adrenals:  RADIOLOGY & ADDITIONAL TESTS:    Imaging Personally Reviewed:  [ ] YES  [ ] NO    Consultant(s) Notes Reviewed:  [ ] YES  [ ] NO    Care Discussed with Consultants/Other Providers [ ] YES  [ ] NO
CHIEF COMPLAINT/INTERVAL HISTORY:    Patient is a 57y old  Female who presents with a chief complaint of sore throat (23 Jan 2018 16:02)      HPI:  56 yo female with PMH: HTN admitted with c/o sore throat that started yesterday.  Pt stated that the pain started abruptly during the day and got progressively worse despite taking codeine.  Near the end of the night pt stated that it hurt so much that she couldn't swallow anything.  She did c/o of mild cough but non-productive, more of just salivary secretions.  Pt called 911 when she felt her throat closing up and felt like she was gasping for air.  On arrival to ER pt noted to have "hot potato" voice and was given a dose of decadron and toradol with pt having immediate relief of her symptoms stating that she wasn't having any more significant pain and does not feeling of throat closing.  Neck XR in ER suspicious for epiglottitis (23 Jan 2018 16:02)      SUBJECTIVE & OBJECTIVE: Pt seen and examined at bedside.  Feels better  Odynophagia resolved.  no sob  no cough  no stridor, no wheezing  Tolerating diet without any difficulty.      Vital Signs Last 24 Hrs  T(C): 37.1 (25 Jan 2018 12:14), Max: 37.1 (25 Jan 2018 05:34)  T(F): 98.8 (25 Jan 2018 12:14), Max: 98.8 (25 Jan 2018 12:14)  HR: 88 (25 Jan 2018 12:14) (58 - 97)  BP: 124/76 (25 Jan 2018 12:14) (118/65 - 127/76)  BP(mean): --  ABP: --  ABP(mean): --  RR: 16 (25 Jan 2018 12:14) (16 - 18)  SpO2: 96% (25 Jan 2018 12:14) (96% - 100%)        MEDICATIONS  (STANDING):  ALBUTerol    90 MICROgram(s) HFA Inhaler 1 Puff(s) Inhalation every 4 hours  cefTRIAXone   IVPB 1 Gram(s) IV Intermittent every 24 hours  cefTRIAXone   IVPB      enoxaparin Injectable 40 milliGRAM(s) SubCutaneous daily  vancomycin  IVPB 1000 milliGRAM(s) IV Intermittent every 8 hours    MEDICATIONS  (PRN):  benzocaine 15 mG/menthol 3.6 mG Lozenge 1 Lozenge Oral every 4 hours PRN Sore Throat        PHYSICAL EXAM  GENERAL: NAD,  well-developed  HEAD:  Atraumatic, Normocephalic  EYES: EOMI, PERRLA, conjunctiva and sclera clear  ENMT: Moist mucous membranes  NECK: Supple, No JVD  NERVOUS SYSTEM:  Alert & Oriented X3, Motor Strength 5/5 B/L upper and lower extremities;   CHEST/LUNG: Clear to auscultation bilaterally; No rales, rhonchi, wheezing, or rubs  HEART: Regular rate and rhythm;   ABDOMEN: Soft, Nontender, Nondistended; Bowel sounds present  EXTREMITIES: no cyanosis, or edema      LABS:                        13.0   9.42  )-----------( 306      ( 24 Jan 2018 10:14 )             37.9     01-24    143  |  105  |  15  ----------------------------<  156<H>  4.0   |  31  |  0.94    Ca    9.0      24 Jan 2018 10:14  Phos  3.4     01-24  Mg     2.1     01-24
CHIEF COMPLAINT/INTERVAL HISTORY:    Patient is a 57y old  Female who presents with a chief complaint of sore throat (23 Jan 2018 16:02)      HPI:  58 yo female with PMH: HTN admitted with c/o sore throat that started yesterday.  Pt stated that the pain started abruptly during the day and got progressively worse despite taking codeine.  Near the end of the night pt stated that it hurt so much that she couldn't swallow anything.  She did c/o of mild cough but non-productive, more of just salivary secretions.  Pt called 911 when she felt her throat closing up and felt like she was gasping for air.  On arrival to ER pt noted to have "hot potato" voice and was given a dose of decadron and toradol with pt having immediate relief of her symptoms stating that she wasn't having any more significant pain and does not feeling of throat closing.  Neck XR in ER suspicious for epiglottitis (23 Jan 2018 16:02)      SUBJECTIVE & OBJECTIVE: Pt seen and examined at bedside.     ICU Vital Signs Last 24 Hrs  T(C): 36.1 (24 Jan 2018 05:26), Max: 37.1 (23 Jan 2018 10:56)  T(F): 96.9 (24 Jan 2018 05:26), Max: 98.8 (23 Jan 2018 15:11)  HR: 70 (24 Jan 2018 05:26) (67 - 94)  BP: 132/75 (24 Jan 2018 05:26) (118/65 - 156/80)  BP(mean): 101 (24 Jan 2018 00:00) (77 - 103)  ABP: --  ABP(mean): --  RR: 18 (24 Jan 2018 05:26) (13 - 22)  SpO2: 98% (24 Jan 2018 05:26) (92% - 99%)        MEDICATIONS  (STANDING):  ALBUTerol    90 MICROgram(s) HFA Inhaler 1 Puff(s) Inhalation every 4 hours  ALBUTerol/ipratropium for Nebulization 3 milliLiter(s) Nebulizer every 6 hours  cefTRIAXone   IVPB 1 Gram(s) IV Intermittent every 24 hours  cefTRIAXone   IVPB      enoxaparin Injectable 40 milliGRAM(s) SubCutaneous daily  influenza   Vaccine 0.5 milliLiter(s) IntraMuscular once  lactated ringers. 1000 milliLiter(s) (50 mL/Hr) IV Continuous <Continuous>  pantoprazole  Injectable 40 milliGRAM(s) IV Push daily  tiotropium 18 MICROgram(s) Capsule 1 Capsule(s) Inhalation daily  vancomycin  IVPB 1000 milliGRAM(s) IV Intermittent every 8 hours    MEDICATIONS  (PRN):  benzocaine 15 mG/menthol 3.6 mG Lozenge 1 Lozenge Oral every 4 hours PRN Sore Throat        PHYSICAL EXAM:    GENERAL: NAD, well-groomed, well-developed  HEAD:  Atraumatic, Normocephalic  EYES: EOMI, PERRLA, conjunctiva and sclera clear  ENMT: Moist mucous membranes  NECK: Supple, No JVD  NERVOUS SYSTEM:  Alert & Oriented X3, Motor Strength 5/5 B/L upper and lower extremities; DTRs 2+ intact and symmetric  CHEST/LUNG: Clear to auscultation bilaterally; No rales, rhonchi, wheezing, or rubs  HEART: Regular rate and rhythm; No murmurs, rubs, or gallops  ABDOMEN: Soft, Nontender, Nondistended; Bowel sounds present  EXTREMITIES:  2+ Peripheral Pulses, No clubbing, cyanosis, or edema    LABS:                        13.9   7.6   )-----------( 307      ( 23 Jan 2018 03:46 )             40.6     01-23    142  |  103  |  11  ----------------------------<  108<H>  4.0   |  31  |  0.84    Ca    9.2      23 Jan 2018 03:46    TPro  8.5<H>  /  Alb  4.2  /  TBili  0.7  /  DBili  x   /  AST  27  /  ALT  16  /  AlkPhos  82  01-23    PT/INR - ( 23 Jan 2018 03:46 )   PT: 13.0 sec;   INR: 1.19 ratio         PTT - ( 23 Jan 2018 03:46 )  PTT:36.9 sec    DVT prophylaxis with SC lovenox
Patient is a 57y old  Female who presents with a chief complaint of sore throat (23 Jan 2018 16:02)      INTERVAL HPI / OVERNIGHT EVENTS:able to eat oral food    MEDICATIONS  (STANDING):  cefTRIAXone   IVPB 1 Gram(s) IV Intermittent every 24 hours  cefTRIAXone   IVPB      enoxaparin Injectable 40 milliGRAM(s) SubCutaneous daily    MEDICATIONS  (PRN):  benzocaine 15 mG/menthol 3.6 mG Lozenge 1 Lozenge Oral every 4 hours PRN Sore Throat      Vital Signs Last 24 Hrs  T(C): 36.6 (25 Jan 2018 17:14), Max: 37.1 (25 Jan 2018 05:34)  T(F): 97.9 (25 Jan 2018 17:14), Max: 98.8 (25 Jan 2018 12:14)  HR: 94 (25 Jan 2018 17:14) (58 - 94)  BP: 129/80 (25 Jan 2018 17:14) (118/65 - 129/80)  BP(mean): --  RR: 18 (25 Jan 2018 17:14) (16 - 18)  SpO2: 96% (25 Jan 2018 17:14) (96% - 100%)    PHYSICAL EXAM:  General :NAD  Constitutional:  well-groomed, well-developed  Respiratory: CTAB/L  Cardiovascular: S1 and S2, RRR, no M/G/R  Gastrointestinal: BS+, soft, NT/ND  Extremities: No peripheral edema  Vascular: 2+ peripheral pulses  Skin: No rashes      LABS:                        13.0   9.42  )-----------( 306      ( 24 Jan 2018 10:14 )             37.9     01-24    143  |  105  |  15  ----------------------------<  156<H>  4.0   |  31  |  0.94    Ca    9.0      24 Jan 2018 10:14  Phos  3.4     01-24  Mg     2.1     01-24            MICROBIOLOGY:  RECENT CULTURES:  01-23 .Blood Blood XXXX XXXX   No growth to date.    01-23 .Blood Blood XXXX XXXX   No growth to date.          RADIOLOGY & ADDITIONAL STUDIES:

## 2018-01-26 NOTE — DISCHARGE NOTE ADULT - CARE PROVIDER_API CALL
Omar Reece), EndocrinologyMetabDiabetes; Internal Medicine  [address]  [address]  [place] [address]  Phone: [phone]  Fax: [phone]     (your PCP),   Phone: (   )    -  Fax: (   )    -

## 2018-01-26 NOTE — DISCHARGE NOTE ADULT - CARE PLAN
Principal Discharge DX:	Acute pharyngitis, unspecified etiology  Goal:	stable  Assessment and plan of treatment:	resolved, follow up with your PMD  you have been admitted to hospital on 1/23/18 and being discharged on 1/26/18, you may resume daily activities and return to work  Secondary Diagnosis:	Essential hypertension  Assessment and plan of treatment:	continue with home meds, follow up with your PCP  Secondary Diagnosis:	Thyroid nodule  Assessment and plan of treatment:	Follow up with  to plan ultrasound  as an out-patient

## 2018-01-26 NOTE — DISCHARGE NOTE ADULT - MEDICATION SUMMARY - MEDICATIONS TO TAKE
I will START or STAY ON the medications listed below when I get home from the hospital:    Hyzaar  -- Indication: For HTN (hypertension)

## 2018-01-26 NOTE — DISCHARGE NOTE ADULT - PATIENT PORTAL LINK FT
“You can access the [facility] Patient Portal, offered by [facility], by registering with the following website: [email or web address]”

## 2018-01-26 NOTE — PROGRESS NOTE ADULT - PROBLEM SELECTOR PLAN 1
ultrasound is needed - can be done as an out-patient   Follow up Pending labs   no treatment  indicated for now
c/w IV rocephin/vanco  Influenza/RVP negative  f/u cx  will  advance to soft diet, as odynophagia resolved.  ID consult/Dr. Dasilva noted.  Cepacol lozenges prn  no e/o SOB, pt refusing for duoneb, will dc neb rx.
c/w IV rocephin/vanco  Influenza/RVP negative  f/u cx  will start CLda nd advance as odynophagia resolves.  ID consult/Dr. jc corado prn

## 2018-01-26 NOTE — DISCHARGE NOTE ADULT - PLAN OF CARE
stable resolved, follow up with your PMD  you have been admitted to hospital on 1/23/18 and being discharged on 1/26/18, you may resume daily activities and return to work continue with home meds, follow up with your PCP Follow up with  to plan ultrasound  as an out-patient

## 2018-01-26 NOTE — PROGRESS NOTE ADULT - PROBLEM SELECTOR PROBLEM 1
Thyroid nodule
Acute pharyngitis, unspecified etiology

## 2018-01-26 NOTE — PROGRESS NOTE ADULT - ASSESSMENT
thyroid nodules
56 yo female with PMH: HTN admitted with c/o sore throat that started yesterday concerning for epiglottitis, received iv decadron, seen by ENT had laryngoscopy, airway patent/No e/o epiglottitis, and on IV vanco/Rocephin for Dx with acute pharyngitis  laryngoscopy
57 yr old female seen with
58 yo female with PMH: HTN admitted with c/o sore throat, admitted with concern for epiglottitis, received iv decadron, seen by ENT had laryngoscopy, airway patent/No e/o epiglottitis, and on IV vanco/Rocephin for acute pharyngitis.

## 2018-01-27 LAB
CULTURE RESULTS: SIGNIFICANT CHANGE UP
SPECIMEN SOURCE: SIGNIFICANT CHANGE UP

## 2018-01-28 LAB
CULTURE RESULTS: SIGNIFICANT CHANGE UP
CULTURE RESULTS: SIGNIFICANT CHANGE UP
SPECIMEN SOURCE: SIGNIFICANT CHANGE UP
SPECIMEN SOURCE: SIGNIFICANT CHANGE UP

## 2018-01-30 DIAGNOSIS — J02.9 ACUTE PHARYNGITIS, UNSPECIFIED: ICD-10-CM

## 2018-01-30 DIAGNOSIS — I10 ESSENTIAL (PRIMARY) HYPERTENSION: ICD-10-CM

## 2018-01-30 DIAGNOSIS — E04.1 NONTOXIC SINGLE THYROID NODULE: ICD-10-CM

## 2018-01-30 DIAGNOSIS — R13.19 OTHER DYSPHAGIA: ICD-10-CM

## 2019-06-01 NOTE — ED ADULT NURSE NOTE - GENITOURINARY WDL
Internal Medicine Subjective





- Subjective


Patient seen and examined:: with staff, chart reviewed


Patient is:: awake, verbal, interactive, confused


Per staff patient has:: no adverse event, poor appetite





Internal Medicine Objective





- Results


Recent Labs: 


 Laboratory Last Values











Triglycerides  53 mg/dL (<150)   19  06:10    


 


Cholesterol  115 mg/dL (<200)   19  06:10    


 


LDL Cholesterol Direct  61 mg/dL ()  L  19  06:10    


 


HDL Cholesterol  36 mg/dL (23-92)   19  06:10    














- Physical Exam


Vitals and I&O: 


 Vital Signs











Temp  98.5 F   19 06:00


 


Pulse  68   19 06:00


 


Resp  18   19 06:00


 


BP  122/75   19 06:00


 


Pulse Ox  95   19 06:00








 Intake & Output











 19





 18:59 06:59 18:59


 


Intake Total 850 605 


 


Balance 850 605 


 


Intake:   


 


  Oral 850 605 


 


Other:   


 


  # Voids 3 3 


 


  # Bowel Movements 1  


 


  Stool Characteristics  Soft 





  Formed 











Active Medications: 


Current Medications





Acetaminophen (Tylenol)  650 mg PO Q4HR PRN


   PRN Reason: Mild Pain / Temp above 100


   Stop: 19 00:31


Al Hydrox/Mg Hydrox/Simethicone (Maalox)  30 ml PO Q4HR PRN


   PRN Reason: GI DISTRESS


   Stop: 19 00:31


Ascorbic Acid (Vitamin C)  500 mg PO DAILY GABY


   Stop: 19 08:59


   Last Admin: 19 08:35 Dose:  500 mg


Haloperidol Lactate (Haldol Concentrate 10mg/5ml Susp)  5 mg PO BID GABY; 

Protocol


   Stop: 19 16:59


   Last Admin: 19 08:34 Dose:  5 mg


Lorazepam (Ativan)  0.5 mg PO Q4HR PRN; Protocol


   PRN Reason: Anxiety


   Stop: 19 00:49


   Last Admin: 19 17:15 Dose:  0.5 mg


Magnesium Hydroxide (Milk Of Magnesia)  30 ml PO HS PRN


   PRN Reason: Constipation


Multivitamins/Vitamin C (Theragran)  1 tab PO DAILY GABY


   Stop: 19 08:59


   Last Admin: 19 08:35 Dose:  1 tab


Trazodone HCl (Desyrel)  50 mg PO HS GABY; Protocol


   Stop: 19 20:59


   Last Admin: 19 21:50 Dose:  50 mg


Zolpidem Tartrate (Ambien)  5 mg PO HS PRN


   PRN Reason: Insomnia


   Stop: 19 00:49


   Last Admin: 19 21:16 Dose:  5 mg








General: demented


HEENT: NC/AT, PERRLA, EOMI


Neck: Supple, No JVD


Lungs: CTAB


Cardiovascular: RRR, Normal S1, Normal S2


Abdomen: soft, non-tender, positive bowel sound


Extremities: excoriation


Neurological: no change





Internal Medicine Assmt/Plan





- Assessment


Assessment: 





ASSESSMENT AND PLAN:  Dehydration, malnutrition, anemia, renal insufficiency. 


generalized weakness








- Plan


Plan: 





PLAN:  


We will provide the patient adequate nutritional support.  


will provide hydration, supplements, sypmtomatic care and treatment.  


We will continue to follow.








Nutritional Asmnt/Malnutr-PDOC





- Dietary Evaluation


Malnutrition Findings (Please click <Entered> for more info): 








Nutritional Asmnt/Malnutrition                             Start:  19 14:

01


Text:                                                      Status: Complete    

  


Freq:                                                                          

  


Protocol:                                                                      

  


 Document     19 14:02  LCHENG  (Rec: 19 14:02  LCHENG  [id number])


 Nutritional Asmnt/Malnutrition


     Patient General Information


      Nutritional Screening                      High Risk


                                                 Consult


      Diagnosis                                  psychosis


      Pertinent Medical Hx/Surgical Hx           no H&P as of this time


      Subjective Information                     Consult received for poor


                                                 appetite. But SHRUTHI Rahman


                                                 reported pt ate well, consumed


                                                 75% breakfast this morning.


                                                 Pt requested strawberry Ensure


                                                 .


      Current Diet Order/ Nutrition Support      regular


      Pertinent Medications                      vit C, theragran


      Pertinent Labs                              reviewed


     Nutritional Hx/Data


      Height                                     1.78 m


      Height (Calculated Centimeters)            177.8


      Current Weight (lbs)                       54.431 kg


      Weight (Calculated Kilograms)              54.4


      Weight (Calculated Grams)                  04992.1


      Ideal Body Weight                          166


      Body Mass Index (BMI)                      17.2


      Weight Status                              Underweight


     GI Symptoms


      GI Symptoms                                None


      Last BM                                    not indicated


      Difficult in:                              None


      Skin Integrity/Comment:                    pressure area to reddened


                                                 sacrume, abrasion to lower


                                                 left extremity


      Current %PO                                Good (%)


     Estimated Nutritional Goals


      BEE in Kcals:                              Using Current wt


      Calories/Kcals/Kg                          27-32


      Kcals Calculated                           0368-7685


      Protein:                                   Using Current wt


      Protein g/k-1.2


      Protein Calculated                         55-66


      Fluid: ml                                  1485-1760ml (1ml/kcal)


     Nutritional Problem


      1. Problem


       Problem                                   underweight


       Etiology                                  possible inadequate energy


                                                 intake


       Signs/Symptoms:                           BMI 17.2


     Intervention/Recommendation


      Comments                                   1. Continue with regular diet


                                                 as ordered. Add Ensure BID (


                                                 strawberry flavor).


                                                 2. Monitor PO intake, wt, labs


                                                 and skin integrity


                                                 3. F/U as low risk in 7 days


     Expected Outcomes/Goals


      Expected Outcomes/Goals                    1. PO intake to meet at least


                                                 75% of nutritional needs.


                                                 2. Wt stability, skin to


                                                 remain intact, labs to


                                                 approach WNL. Bladder non-tender and non-distended. Urine clear yellow.

## 2019-06-14 NOTE — ED ADULT TRIAGE NOTE - ARRIVAL FROM
Home Partial Purse String (Simple) Text: Given the location of the defect and the characteristics of the surrounding skin a simple purse string closure was deemed most appropriate.  Undermining was performed circumfirentially around the surgical defect.  A purse string suture was then placed and tightened. Wound tension only allowed a partial closure of the circular defect.

## 2020-09-16 NOTE — PATIENT PROFILE ADULT. - FALL HARM RISK CONCLUSION
Universal Safety Interventions Azithromycin Pregnancy And Lactation Text: This medication is considered safe during pregnancy and is also secreted in breast milk.

## 2021-10-26 NOTE — ED ADULT NURSE NOTE - GASTROINTESTINAL ASSESSMENT
Hong presents for a colonoscopy    Colonoscopy Procedure Note    Procedure: Colonoscopy --screening    Pre-operative Diagnosis: screening  Post-operative Diagnosis: same    Indications: Screening Colonoscopy no prior colonoscopy.    Sedation: Versed 15 mg IV, fentanyl 125 mcg IV    Anesthesia Moderate sedation was administered with continuous monitoring of the patient by a trained caregiver under my direct supervision. Please refer to nursing documentation for doses of medications administered intravenously as well as the patient’s status during the procedure. An independent observer was present.  Total sedation (intra-service) time in minutes was: 20    Procedure Details     Informed consent was obtained for the procedure, including sedation.  Risks of perforation, hemorrhage, adverse drug reaction and aspiration were discussed. The patient was placed in the left lateral decubitus position.  Based on the pre-procedure assessment, including review of the patient's medical history, medications, allergies, and review of systems, the patient had been deemed to be an appropriate candidate for conscious sedation; the patient was therefore sedated with the medications listed below.   The patient was monitored continuously with ECG tracing, pulse oximetry, blood pressure monitoring, and direct observations.      A rectal examination was performed.  The variable-stiffness pediatric colonoscope was inserted into the rectum and advanced under direct vision to the cecum, which was identified by the ileocecal valve and appendiceal orifice.  The quality of the colonic preparation was good.  A careful inspection was made as the colonoscope was withdrawn, including a retroflexed view of the rectum; findings and interventions are described below.  Appropriate photodocumentation was obtained.    Findings:  -hemorrhoids (internal), Very large in size  Banded with bandito banding system times one    Specimens: none            Complications:  None; patient tolerated the procedure well.           Disposition: PACU - hemodynamically stable.           Condition: stable    Impression:    -Very large internal hemorrhoids.    Recommendations:  -For colon cancer screening in this average-risk patient, colonoscopy may be repeated in 10 years.             WDL

## 2023-03-23 NOTE — CONSULT NOTE ADULT - OPHTHALMOLOGIC
negative [FreeTextEntry1] : Examination of the Cervical Spine \par Flexion 48 degrees\par Extension 15 degrees\par Lateral Bend R 25 degrees   L 20 degrees\par Rotation R 45 degrees        L 50 degrees\par \par Palpation cervical spine: Tenderness and spasm bilateral trapezii and cervical paraspinal musculature. \par MMT U/E:  intact \par Reflexes: 2+ \par  \par \par Examination of the Lumbar Spine \par Flexion 50 degrees \par Extension 5 degrees \par Lateral Bend R 20 degrees  L 20 degrees \par Rotation R 30 degrees L 35 degrees\par \par Palpation of the Lumbar Spine: Increasing tenderness and spasm involving his bilateral lower lumbar paraspinal musculature. \par SLR: +40 degrees bilaterally \par \par MMT L/E: intact \par \par Reflexes: 2+ throughout

## 2023-12-01 NOTE — ED ADULT TRIAGE NOTE - NSWEIGHTCALCTOOLDRUG_GEN_A_CORE
The patient has been re-examined and I agree with the above assessment or I updated with my findings.
 used